# Patient Record
(demographics unavailable — no encounter records)

---

## 2025-04-30 NOTE — DEVELOPMENTAL MILESTONES
[None] : none [Plays alongside other children] : plays alongside other children [Takes off some clothing] : takes off some clothing [Scoops well with spoon] : scoops well with spoon [Uses 50 words] : uses 50 words [Combine 2 words into phrase or] : combines 2 words into phrase or sentences [Follows 2-step command] : follows 2-step command [Uses words that are 50% intelligible] : uses words that are 50% intelligible to strangers [Kicks ball] : kicks ball  [Jumps off ground with 2 feet] : jumps off ground with 2 feet [Climbs up a ladder at a] : climbs up a ladder at a playground [Stacks objects] : stacks objects [Turns book pages] : turns book pages [Uses hands to turn objects] : uses hands to turn objects [Passed] : passed [Runs with coordination] : does not run with coordination

## 2025-04-30 NOTE — DISCUSSION/SUMMARY
[] : The components of the vaccine(s) to be administered today are listed in the plan of care. The disease(s) for which the vaccine(s) are intended to prevent and the risks have been discussed with the caretaker.  The risks are also included in the appropriate vaccination information statements which have been provided to the patient's caregiver.  The caregiver has given consent to vaccinate. [Normal Growth] : growth [Normal Development] : development [None] : No known medical problems [No Elimination Concerns] : elimination [No Feeding Concerns] : feeding [No Skin Concerns] : skin [Normal Sleep Pattern] : sleep [Assessment of Language Development] : assessment of language development [Temperament and Behavior] : temperament and behavior [Toilet Training] : toilet training [TV Viewing] : tv viewing [Safety] : safety [No Medications] : ~He/She~ is not on any medications [Parent/Guardian] : parent/guardian [FreeTextEntry1] : 23 month old female with hx of beta thalassemia minor and VSD presenting for [facility]. Family had been away in [place] x11 months, just returned last week. Did see a pediatrician and received 15 month (DTaP and Hib) vaccines there (documentation provided). No major medical concerns while away. Child growing and developing well. No developmental concerns per parents or on evaluation today. Physical exam unremarkable - difficult to appreciate presence or lack of murmur on exam due to crying. Family has follow up appointment scheduled for cardiology in 2 weeks.  - VZV and Hep A discussed and administered today  - CBC, lead ordered today  - Follow up with cardiology as scheduled  - Est dental care, pediatric referral list provided today   Continue cow's milk - no more than 12-16 oz daily. Continue table foods, 3 meals with 2-3 snacks per day.  Incorporate fluorinated water daily in a sippy cup.  Brush teeth twice a day with soft toothbrush.  Recommend visit to dentist.  When in car, keep child in rear-facing car seats until age 2, or until  the maximum height and weight for seat is reached.  Put toddler to sleep in own bed.  Help toddler to maintain consistent daily routines and sleep schedule.  Toilet training discussed.  Ensure home is safe.  Use consistent, positive discipline.  Read aloud to toddler.  Limit screen time to no more than 2 hours per day.  SDOH domains were screened and scored. RTC in 6 months for [facility]

## 2025-04-30 NOTE — PHYSICAL EXAM
[Alert] : alert [Crying] : crying [Normocephalic] : normocephalic [Anterior [place] Closed] : anterior fontanelle closed [Red Reflex Bilateral] : red reflex bilateral [PERRL] : PERRL [Normally Placed Ears] : normally placed ears [No Discharge] : no discharge [Tooth Eruption] : tooth eruption  [Supple, full passive range of motion] : supple, full passive range of motion [Symmetric Chest Rise] : symmetric chest rise [Clear to Auscultation Bilaterally] : clear to auscultation bilaterally [Regular Rate and Rhythm] : regular rate and rhythm [S1, S2 present] : S1, S2 present [Soft] : soft [NonTender] : non tender [No Abnormal Lymph Nodes Palpated] : no abnormal lymph nodes palpated [No Clavicular Crepitus] : no clavicular crepitus [No Spinal Dimple] : no spinal dimple [NoTuft of Hair] : no tuft of hair [Cranial Nerves Grossly Intact] : cranial nerves grossly intact [No Rash or Lesions] : no rash or lesions [FreeTextEntry8] : Murmur difficult to appreciate secondary to crying

## 2025-04-30 NOTE — HISTORY OF PRESENT ILLNESS
[Father] : father [Fruit] : fruit [Vegetables] : vegetables [Dairy] : dairy [Normal] : Normal [Brushing teeth] : Brushing teeth [Tap water] : Primary Fluoride Source: Tap water [Playtime 60 min a day] : Playtime 60 min a day [Toilet Training] : Toilet training [No] : No cigarette smoke exposure [Car seat in back seat] : Car seat in back seat [Smoke Detectors] : Smoke detectors [Carbon Monoxide Detectors] : Carbon monoxide detectors [NO] : No [Exposure to electronic nicotine delivery system] : No exposure to electronic nicotine delivery system [FreeTextEntry7] : Family away in [facility] x11 months, saw pediatrician and received Dtap and Hib there  [de-identified] : No concerns  [de-identified] : Follows vegetarian diet. Not currently taking iron supplement.  [de-identified] : Overdue for VZV and Hep A (18 mo vaccines)

## 2025-05-14 NOTE — PHYSICAL EXAM
[General Appearance - Alert] : alert [General Appearance - In No Acute Distress] : in no acute distress [General Appearance - Well Nourished] : well nourished [General Appearance - Well Developed] : well developed [General Appearance - Well-Appearing] : well appearing [Appearance Of Head] : the head was normocephalic [Facies] : there were no dysmorphic facial features [Sclera] : the conjunctiva were normal [Outer Ear] : the ears and nose were normal in appearance [Examination Of The Oral Cavity] : mucous membranes were moist and pink [Auscultation Breath Sounds / Voice Sounds] : breath sounds clear to auscultation bilaterally [Normal Chest Appearance] : the chest was normal in appearance [Apical Impulse] : quiet precordium with normal apical impulse [Heart Rate And Rhythm] : normal heart rate and rhythm [Heart Sounds] : normal S1 and S2 [Heart Sounds Gallop] : no gallops [Heart Sounds Pericardial Friction Rub] : no pericardial rub [Edema] : no edema [Heart Sounds Click] : no clicks [Arterial Pulses] : normal upper and lower extremity pulses with no pulse delay [Capillary Refill Test] : normal capillary refill [Systolic] : systolic [II] : a grade 2/6 [LLSB] : LLSB  [LMSB] : LMSB  [Vibratory] : vibratory [Abdomen Soft] : soft [Nondistended] : nondistended [Abdomen Tenderness] : non-tender [Nail Clubbing] : no clubbing  or cyanosis of the fingers [Motor Tone] : normal muscle strength and tone [Skin Lesions] : no lesions [] : no rash [Skin Turgor] : normal turgor [Demonstrated Behavior - Infant Nonreactive To Parents] : interactive [Mood] : mood and affect were appropriate for age [Demonstrated Behavior] : normal behavior

## 2025-05-14 NOTE — REASON FOR VISIT
[Follow-Up] : a follow-up visit for [Parents] : parents [FreeTextEntry3] : hx: Ventricular Septal Defect

## 2025-05-14 NOTE — CARDIOLOGY SUMMARY
[Normal] : normal [Today's Date] : [unfilled] [FreeTextEntry1] : Sinus rhythm with a rate of 114, normal QRS axis, normal intervals, QTc 424.  No evidence of atrial or ventricular enlargement.  Normal T waves and ST segments.  No delta waves.

## 2025-05-14 NOTE — HISTORY OF PRESENT ILLNESS
[FreeTextEntry1] : I had the pleasure of seeing ELVIS LUTZ in the pediatric cardiology clinic at [facility] on May 14, 2025; she was last seen on February 14, 2024.  Elvis is a 2-year-old girl here for follow-up of a small muscular VSD.  Since her last appointment she has been doing very well - she is growing and developing normally.  She continues to breast feed, along with taking solid foods. No episodes of cyanosis, pallor or grey skin discoloration. No signs of increased work of breathing or tachypnea.  She is a very energetic and happy toddler.

## 2025-05-14 NOTE — DISCUSSION/SUMMARY
[FreeTextEntry1] : Elvis is a 9-month-old baby girl with a small muscular VSD. On exam, I hear two different heart murmurs - one from a small muscular VSD, and another which is a Still's murmur.  She is growing and developing normally.  I continue to anticipate that this VSD will close spontaneously, and would not cause any symptoms.     Recommendations: - No cardiac medications indicated - F/U 6 month - likely will obtain echocardiogram at that time given the two different heart murmurs  UD  No further pediatric cardiology follow-up is required, but I would be more than happy to see ELVIS back in clinic if any further symptoms or concerns arise.

## 2025-05-14 NOTE — CONSULT LETTER
[Today's Date] : [unfilled] [Name] : Name: [unfilled] [] : : ~~ [Today's Date:] : [unfilled] [Dear  ___:] : Dear Dr. [unfilled]: [Consult - Single Provider] : Thank you very much for allowing me to participate in the care of this patient. If you have any questions, please do not hesitate to contact me. [Sincerely,] : Sincerely, [FreeTextEntry4] : Ayden Ingram MD [FreeTextEntry5] : [address], [address], [place] [address] [FreeTextEntry6] : [phone] [de-identified] : Gina Agarwal MD Attending Pediatric Cardiologist  [facility] [phone] [email or web address]

## 2025-06-11 NOTE — DISCUSSION/SUMMARY
[FreeTextEntry1] : 1 YO here for intermittent COugh start zyrtec qhs advised moc to use humidifier, monitor temp if fever occurs for more than 3 days to RTC for further assessment  Educated MOC to help prevent symptoms by having your child avoid the things they are allergic to. For example, if your child is allergic to pollen, you can: Keep your child inside during the times of the year when they have symptoms Keep car and house windows closed, and use air conditioning instead Have your child take a bath or shower before bed to rinse pollen off the hair and skin Use a vacuum with a special filter (called a "HEPA filter") to keep indoor air as clean as possible

## 2025-06-11 NOTE — PHYSICAL EXAM
[Alert] : alert [Playful] : playful [Clear to Auscultation Bilaterally] : clear to auscultation bilaterally [NL] : warm, clear

## 2025-06-11 NOTE — HISTORY OF PRESENT ILLNESS
[FreeTextEntry6] : cough intermittently x1 week afebrile uses humidifier and air purifier denies n/v/d denies rhinorrhea